# Patient Record
Sex: MALE | Race: WHITE | NOT HISPANIC OR LATINO | Employment: OTHER | URBAN - METROPOLITAN AREA
[De-identification: names, ages, dates, MRNs, and addresses within clinical notes are randomized per-mention and may not be internally consistent; named-entity substitution may affect disease eponyms.]

---

## 2021-08-14 ENCOUNTER — APPOINTMENT (EMERGENCY)
Dept: CT IMAGING | Facility: HOSPITAL | Age: 61
End: 2021-08-14
Payer: COMMERCIAL

## 2021-08-14 ENCOUNTER — HOSPITAL ENCOUNTER (EMERGENCY)
Facility: HOSPITAL | Age: 61
Discharge: HOME | End: 2021-08-15
Attending: EMERGENCY MEDICINE | Admitting: EMERGENCY MEDICINE
Payer: COMMERCIAL

## 2021-08-14 VITALS
SYSTOLIC BLOOD PRESSURE: 168 MMHG | HEART RATE: 98 BPM | TEMPERATURE: 97.6 F | RESPIRATION RATE: 18 BRPM | OXYGEN SATURATION: 98 % | WEIGHT: 165 LBS | DIASTOLIC BLOOD PRESSURE: 82 MMHG

## 2021-08-14 DIAGNOSIS — N20.0 KIDNEY STONE: Primary | ICD-10-CM

## 2021-08-14 LAB
ALBUMIN SERPL BCP-MCNC: 4.5 G/DL (ref 3.5–5)
ALP SERPL-CCNC: 79 U/L (ref 46–116)
ALT SERPL W P-5'-P-CCNC: 32 U/L (ref 12–78)
ANION GAP SERPL CALCULATED.3IONS-SCNC: 16 MMOL/L (ref 4–13)
AST SERPL W P-5'-P-CCNC: 29 U/L (ref 5–45)
BACTERIA UR QL AUTO: ABNORMAL /HPF
BASOPHILS # BLD AUTO: 0.03 THOUSANDS/ΜL (ref 0–0.1)
BASOPHILS NFR BLD AUTO: 0 % (ref 0–1)
BILIRUB SERPL-MCNC: 0.66 MG/DL (ref 0.2–1)
BILIRUB UR QL STRIP: NEGATIVE
BUN SERPL-MCNC: 20 MG/DL (ref 5–25)
CALCIUM SERPL-MCNC: 9.7 MG/DL (ref 8.3–10.1)
CHLORIDE SERPL-SCNC: 105 MMOL/L (ref 100–108)
CLARITY UR: CLEAR
CO2 SERPL-SCNC: 18 MMOL/L (ref 21–32)
COLOR UR: YELLOW
CREAT SERPL-MCNC: 1.5 MG/DL (ref 0.6–1.3)
EOSINOPHIL # BLD AUTO: 0.08 THOUSAND/ΜL (ref 0–0.61)
EOSINOPHIL NFR BLD AUTO: 1 % (ref 0–6)
ERYTHROCYTE [DISTWIDTH] IN BLOOD BY AUTOMATED COUNT: 11.9 % (ref 11.6–15.1)
GFR SERPL CREATININE-BSD FRML MDRD: 50 ML/MIN/1.73SQ M
GLUCOSE SERPL-MCNC: 128 MG/DL (ref 65–140)
GLUCOSE UR STRIP-MCNC: NEGATIVE MG/DL
HCT VFR BLD AUTO: 47.5 % (ref 36.5–49.3)
HGB BLD-MCNC: 15.6 G/DL (ref 12–17)
HGB UR QL STRIP.AUTO: ABNORMAL
IMM GRANULOCYTES # BLD AUTO: 0.07 THOUSAND/UL (ref 0–0.2)
IMM GRANULOCYTES NFR BLD AUTO: 1 % (ref 0–2)
KETONES UR STRIP-MCNC: NEGATIVE MG/DL
LEUKOCYTE ESTERASE UR QL STRIP: NEGATIVE
LIPASE SERPL-CCNC: 71 U/L (ref 73–393)
LYMPHOCYTES # BLD AUTO: 1.73 THOUSANDS/ΜL (ref 0.6–4.47)
LYMPHOCYTES NFR BLD AUTO: 13 % (ref 14–44)
MCH RBC QN AUTO: 31.3 PG (ref 26.8–34.3)
MCHC RBC AUTO-ENTMCNC: 32.8 G/DL (ref 31.4–37.4)
MCV RBC AUTO: 95 FL (ref 82–98)
MONOCYTES # BLD AUTO: 0.8 THOUSAND/ΜL (ref 0.17–1.22)
MONOCYTES NFR BLD AUTO: 6 % (ref 4–12)
NEUTROPHILS # BLD AUTO: 11.18 THOUSANDS/ΜL (ref 1.85–7.62)
NEUTS SEG NFR BLD AUTO: 79 % (ref 43–75)
NITRITE UR QL STRIP: NEGATIVE
NON-SQ EPI CELLS URNS QL MICRO: ABNORMAL /HPF
NRBC BLD AUTO-RTO: 0 /100 WBCS
PH UR STRIP.AUTO: 5.5 [PH]
PLATELET # BLD AUTO: 268 THOUSANDS/UL (ref 149–390)
PMV BLD AUTO: 10.5 FL (ref 8.9–12.7)
POTASSIUM SERPL-SCNC: 3.8 MMOL/L (ref 3.5–5.3)
PROT SERPL-MCNC: 7.8 G/DL (ref 6.4–8.2)
PROT UR STRIP-MCNC: NEGATIVE MG/DL
RBC # BLD AUTO: 4.98 MILLION/UL (ref 3.88–5.62)
RBC #/AREA URNS AUTO: ABNORMAL /HPF
SODIUM SERPL-SCNC: 139 MMOL/L (ref 136–145)
SP GR UR STRIP.AUTO: 1.02 (ref 1–1.03)
UROBILINOGEN UR QL STRIP.AUTO: 0.2 E.U./DL
WBC # BLD AUTO: 13.89 THOUSAND/UL (ref 4.31–10.16)
WBC #/AREA URNS AUTO: ABNORMAL /HPF

## 2021-08-14 PROCEDURE — G1004 CDSM NDSC: HCPCS

## 2021-08-14 PROCEDURE — 80053 COMPREHEN METABOLIC PANEL: CPT | Performed by: EMERGENCY MEDICINE

## 2021-08-14 PROCEDURE — 81001 URINALYSIS AUTO W/SCOPE: CPT | Performed by: EMERGENCY MEDICINE

## 2021-08-14 PROCEDURE — 96361 HYDRATE IV INFUSION ADD-ON: CPT

## 2021-08-14 PROCEDURE — 96374 THER/PROPH/DIAG INJ IV PUSH: CPT

## 2021-08-14 PROCEDURE — 74176 CT ABD & PELVIS W/O CONTRAST: CPT

## 2021-08-14 PROCEDURE — 36415 COLL VENOUS BLD VENIPUNCTURE: CPT

## 2021-08-14 PROCEDURE — 99284 EMERGENCY DEPT VISIT MOD MDM: CPT

## 2021-08-14 PROCEDURE — 99285 EMERGENCY DEPT VISIT HI MDM: CPT | Performed by: EMERGENCY MEDICINE

## 2021-08-14 PROCEDURE — 83690 ASSAY OF LIPASE: CPT | Performed by: EMERGENCY MEDICINE

## 2021-08-14 PROCEDURE — 85025 COMPLETE CBC W/AUTO DIFF WBC: CPT | Performed by: EMERGENCY MEDICINE

## 2021-08-14 RX ORDER — MORPHINE SULFATE 4 MG/ML
4 INJECTION, SOLUTION INTRAMUSCULAR; INTRAVENOUS ONCE
Status: COMPLETED | OUTPATIENT
Start: 2021-08-14 | End: 2021-08-14

## 2021-08-14 RX ADMIN — SODIUM CHLORIDE 1000 ML: 0.9 INJECTION, SOLUTION INTRAVENOUS at 22:54

## 2021-08-14 RX ADMIN — MORPHINE SULFATE 4 MG: 4 INJECTION INTRAVENOUS at 22:54

## 2021-08-15 PROCEDURE — 96361 HYDRATE IV INFUSION ADD-ON: CPT

## 2021-08-15 RX ORDER — SULFAMETHOXAZOLE AND TRIMETHOPRIM 800; 160 MG/1; MG/1
1 TABLET ORAL 2 TIMES DAILY
Qty: 10 TABLET | Refills: 0 | Status: SHIPPED | OUTPATIENT
Start: 2021-08-15 | End: 2021-08-20

## 2021-08-15 RX ORDER — OXYCODONE HYDROCHLORIDE 5 MG/1
5 TABLET ORAL ONCE
Status: COMPLETED | OUTPATIENT
Start: 2021-08-15 | End: 2021-08-15

## 2021-08-15 RX ORDER — ONDANSETRON 4 MG/1
4 TABLET, ORALLY DISINTEGRATING ORAL EVERY 6 HOURS PRN
Qty: 16 TABLET | Refills: 0 | Status: SHIPPED | OUTPATIENT
Start: 2021-08-15

## 2021-08-15 RX ORDER — SULFAMETHOXAZOLE AND TRIMETHOPRIM 800; 160 MG/1; MG/1
1 TABLET ORAL 2 TIMES DAILY
Qty: 10 TABLET | Refills: 0 | Status: SHIPPED | OUTPATIENT
Start: 2021-08-15 | End: 2021-08-15 | Stop reason: CLARIF

## 2021-08-15 RX ORDER — SULFAMETHOXAZOLE AND TRIMETHOPRIM 800; 160 MG/1; MG/1
1 TABLET ORAL ONCE
Status: COMPLETED | OUTPATIENT
Start: 2021-08-15 | End: 2021-08-15

## 2021-08-15 RX ORDER — ONDANSETRON 4 MG/1
4 TABLET, ORALLY DISINTEGRATING ORAL ONCE
Status: COMPLETED | OUTPATIENT
Start: 2021-08-15 | End: 2021-08-15

## 2021-08-15 RX ORDER — MORPHINE SULFATE 30 MG/1
15 TABLET ORAL EVERY 8 HOURS PRN
Qty: 12 TABLET | Refills: 0 | Status: SHIPPED | OUTPATIENT
Start: 2021-08-15

## 2021-08-15 RX ORDER — ACETAMINOPHEN 325 MG/1
650 TABLET ORAL EVERY 6 HOURS PRN
Qty: 30 TABLET | Refills: 0 | Status: SHIPPED | OUTPATIENT
Start: 2021-08-15 | End: 2021-08-20

## 2021-08-15 RX ORDER — ACETAMINOPHEN 325 MG/1
650 TABLET ORAL ONCE
Status: COMPLETED | OUTPATIENT
Start: 2021-08-15 | End: 2021-08-15

## 2021-08-15 RX ADMIN — SULFAMETHOXAZOLE AND TRIMETHOPRIM 1 TABLET: 800; 160 TABLET ORAL at 01:57

## 2021-08-15 RX ADMIN — ACETAMINOPHEN 650 MG: 325 TABLET, FILM COATED ORAL at 02:10

## 2021-08-15 RX ADMIN — ONDANSETRON 4 MG: 4 TABLET, ORALLY DISINTEGRATING ORAL at 02:10

## 2021-08-15 RX ADMIN — OXYCODONE HYDROCHLORIDE 5 MG: 5 TABLET ORAL at 02:10

## 2021-08-15 NOTE — DISCHARGE INSTRUCTIONS
Please go to [facility] for BMP lab  If creatinine result returns as greater than 1 5, please return to ED  Please take your Bactrim antibiotic as indicated for the next 4 days  Please follow up with PCP or return to ED for worsening symptoms such as new onset of spiking fevers, worsening abdominal pain, inability to urinate or any other worsening concerns

## 2021-08-15 NOTE — ED PROVIDER NOTES
History  Chief Complaint   Patient presents with    Flank Pain     Pt comes to ED c/o L flank pain since yesterday  +dysuria Hx of kidney stones     70-year-old male with past medical history of remote renal stones status post lithotripsy presents for evaluation of left-sided flank pain  Patient reports he is unsure when the flank pain began however noticed that it became noticeably worse yesterday afternoon  Patient characterizes flank pain as sharp and stabbing that wraps around to left upper quadrant abdomen and is intermittent in nature  He also reports dysuria, frequency and decreased urine stream, having denies associated symptoms of fever, chills, chest pain, any other abdominal pain, melena, N/V/D or any other symptoms  He denies taking any over-the-counter medication for pain before presenting to ED  No other concerns  None       Past Medical History:   Diagnosis Date    Renal disorder        History reviewed  No pertinent surgical history  History reviewed  No pertinent family history  I have reviewed and agree with the history as documented  E-Cigarette/Vaping    E-Cigarette Use Never User      E-Cigarette/Vaping Substances     Social History     Tobacco Use    Smoking status: Never Smoker    Smokeless tobacco: Never Used   Vaping Use    Vaping Use: Never used   Substance Use Topics    Alcohol use: Not Currently    Drug use: Never        Review of Systems   Constitutional: Negative  Negative for chills and fever  HENT: Negative  Eyes: Negative  Respiratory: Negative  Negative for cough and shortness of breath  Cardiovascular: Negative  Negative for chest pain  Gastrointestinal: Positive for abdominal pain  Negative for blood in stool, constipation, diarrhea, nausea and vomiting  Endocrine: Negative  Genitourinary: Positive for difficulty urinating, dysuria and urgency  Negative for frequency and hematuria  Musculoskeletal: Positive for back pain     Skin: Negative  Allergic/Immunologic: Negative  Neurological: Negative  Psychiatric/Behavioral: Negative  All other systems reviewed and are negative  Physical Exam  ED Triage Vitals [08/14/21 2042]   Temperature Pulse Respirations Blood Pressure SpO2   97 6 °F (36 4 °C) 98 18 168/82 98 %      Temp Source Heart Rate Source Patient Position - Orthostatic VS BP Location FiO2 (%)   Oral Monitor Sitting Left arm --      Pain Score       8             Orthostatic Vital Signs  Vitals:    08/14/21 2042   BP: 168/82   Pulse: 98   Patient Position - Orthostatic VS: Sitting       Physical Exam  Vitals and nursing note reviewed  Constitutional:       General: He is not in acute distress  Appearance: Normal appearance  He is not ill-appearing, toxic-appearing or diaphoretic  HENT:      Head: Normocephalic and atraumatic  Right Ear: External ear normal       Left Ear: External ear normal       Nose: Nose normal    Eyes:      Extraocular Movements: Extraocular movements intact  Cardiovascular:      Rate and Rhythm: Normal rate and regular rhythm  Pulses: Normal pulses  Heart sounds: Normal heart sounds  No murmur heard  Pulmonary:      Effort: Pulmonary effort is normal  No respiratory distress  Breath sounds: Normal breath sounds  No wheezing or rales  Abdominal:      General: Abdomen is flat  There is no distension  Palpations: Abdomen is soft  Tenderness: There is no abdominal tenderness  There is left CVA tenderness  There is no guarding or rebound  Comments: Mild left CVA tenderness   Musculoskeletal:         General: Normal range of motion  Cervical back: Normal range of motion  Skin:     General: Skin is warm and dry  Capillary Refill: Capillary refill takes less than 2 seconds  Neurological:      Mental Status: He is alert and oriented to person, place, and time     Psychiatric:         Mood and Affect: Mood normal          Behavior: Behavior normal          ED Medications  Medications   morphine (PF) 4 mg/mL injection 4 mg (4 mg Intravenous Given 8/14/21 2254)   sodium chloride 0 9 % bolus 1,000 mL (0 mL Intravenous Stopped 8/15/21 0136)   sulfamethoxazole-trimethoprim (BACTRIM DS) 800-160 mg per tablet 1 tablet (1 tablet Oral Given 8/15/21 0157)   acetaminophen (TYLENOL) tablet 650 mg (650 mg Oral Given 8/15/21 0210)   oxyCODONE (ROXICODONE) IR tablet 5 mg (5 mg Oral Given 8/15/21 0210)   ondansetron (ZOFRAN-ODT) dispersible tablet 4 mg (4 mg Oral Given 8/15/21 0210)       Diagnostic Studies  Results Reviewed     Procedure Component Value Units Date/Time    Urine Microscopic [[id number]]  (Abnormal) Collected: 08/14/21 2225    Lab Status: Final result Specimen: Urine, Clean Catch Updated: 08/14/21 2358     RBC, UA 20-30 /hpf      WBC, UA 2-4 /hpf      Epithelial Cells Occasional /hpf      Bacteria, UA Occasional /hpf     UA w Reflex to Microscopic w Reflex to Culture [[id number]]  (Abnormal) Collected: 08/14/21 2225    Lab Status: Final result Specimen: Urine, Clean Catch Updated: 08/14/21 2335     Color, UA Yellow     Clarity, UA Clear     Specific Gravity, UA 1 025     pH, UA 5 5     Leukocytes, UA Negative     Nitrite, UA Negative     Protein, UA Negative mg/dl      Glucose, UA Negative mg/dl      Ketones, UA Negative mg/dl      Urobilinogen, UA 0 2 E U /dl      Bilirubin, UA Negative     Blood, UA Trace-Intact    Comprehensive metabolic panel [[id number]]  (Abnormal) Collected: 08/14/21 2046    Lab Status: Final result Specimen: Blood from Arm, Right Updated: 08/14/21 2136     Sodium 139 mmol/L      Potassium 3 8 mmol/L      Chloride 105 mmol/L      CO2 18 mmol/L      ANION GAP 16 mmol/L      BUN 20 mg/dL      Creatinine 1 50 mg/dL      Glucose 128 mg/dL      Calcium 9 7 mg/dL      AST 29 U/L      ALT 32 U/L      Alkaline Phosphatase 79 U/L      Total Protein 7 8 g/dL      Albumin 4 5 g/dL      Total Bilirubin 0 66 mg/dL      eGFR 50 ml/min/1 73sq m Narrative:      National Kidney Disease Foundation guidelines for Chronic Kidney Disease (CKD):     Stage 1 with normal or high GFR (GFR > 90 mL/min/1 73 square meters)    Stage 2 Mild CKD (GFR = 60-89 mL/min/1 73 square meters)    Stage 3A Moderate CKD (GFR = 45-59 mL/min/1 73 square meters)    Stage 3B Moderate CKD (GFR = 30-44 mL/min/1 73 square meters)    Stage 4 Severe CKD (GFR = 15-29 mL/min/1 73 square meters)    Stage 5 End Stage CKD (GFR <15 mL/min/1 73 square meters)  Note: GFR calculation is accurate only with a steady state creatinine    Lipase [[id number]]  (Abnormal) Collected: 08/14/21 2046    Lab Status: Final result Specimen: Blood from Arm, Right Updated: 08/14/21 2136     Lipase 71 u/L     CBC and differential [[id number]]  (Abnormal) Collected: 08/14/21 2046    Lab Status: Final result Specimen: Blood from Arm, Right Updated: 08/14/21 2052     WBC 13 89 Thousand/uL      RBC 4 98 Million/uL      Hemoglobin 15 6 g/dL      Hematocrit 47 5 %      MCV 95 fL      MCH 31 3 pg      MCHC 32 8 g/dL      RDW 11 9 %      MPV 10 5 fL      Platelets 038 Thousands/uL      nRBC 0 /100 WBCs      Neutrophils Relative 79 %      Immat GRANS % 1 %      Lymphocytes Relative 13 %      Monocytes Relative 6 %      Eosinophils Relative 1 %      Basophils Relative 0 %      Neutrophils Absolute 11 18 Thousands/µL      Immature Grans Absolute 0 07 Thousand/uL      Lymphocytes Absolute 1 73 Thousands/µL      Monocytes Absolute 0 80 Thousand/µL      Eosinophils Absolute 0 08 Thousand/µL      Basophils Absolute 0 03 Thousands/µL                  CT renal stone study abdomen pelvis without contrast   Final Result by Carlos Arshad DO (08/15 0107)      Mild left-sided hydroureteronephrosis with a 3 mm obstructing stone in the distal left ureter/ UVJ  Moderate left-sided perinephric stranding      The study was marked in EPIC for immediate notification              Workstation performed: [id number] Procedures  Procedures      ED Course                                       MDM  Number of Diagnoses or Management Options  Kidney stone  Diagnosis management comments: 76-year-old male with past medical history of remote renal stones status post lithotripsy presents for evaluation of left-sided flank pain  Patient reports he is unsure when the flank pain began however noticed that it became noticeably worse yesterday afternoon  Patient remained fairly stable throughout ED course  Pain was well managed with 4 mg of morphine IV, 4 mg Zofran, Tylenol and oxycodone  CBC remarkable for elevated white blood cell count, CMP remarkable for creatinine 1 50 with no previous available for comparison  Urinalysis showed intact red blood cells with no evidence infection  CT abdomen pelvis renal protocol showed 3 mm renal stone in the UPJ with mild L hydronephrosis and mild perinephric stranding  Patient agreed to plan for d/c with follow-up BMP at outside facility to monitor trend in creatinine  Instructed patient to return to ER if creatinine became elevated from today's 1 50 as well as for worsening symptoms such as spiking fevers, difficulty urinating, worsening abdominal pain  D/c to home with Rx pain medications and a 5 day course of Bactrim with 1st dose started here today  Patient agrees to plan  All questions answered  No other concerns         Amount and/or Complexity of Data Reviewed  Clinical lab tests: reviewed and ordered  Tests in the radiology section of CPT®: ordered and reviewed  Tests in the medicine section of CPT®: ordered and reviewed    Risk of Complications, Morbidity, and/or Mortality  Presenting problems: moderate  Diagnostic procedures: moderate  Management options: moderate    Patient Progress  Patient progress: stable      Disposition  Final diagnoses:   Kidney stone     Time reflects when diagnosis was documented in both MDM as applicable and the Disposition within this note     Time User Action Codes Description Comment    8/15/2021  1:36 AM Rea Mckenzie Add [N20 0] Kidney stone       ED Disposition     ED Disposition Condition Date/Time Comment    Discharge Stable Sun Aug 15, 2021  1:36 AM Rosie Chairez discharge to home/self care  Follow-up Information     Follow up With Specialties Details Why [address] [place] [address] Emergency Department Emergency Medicine Go to  If symptoms worsen, if pain worsens, if you develop a fever, if creatining is worse than today: 1 5 2220 [address] Emergency Department, [address], [place], [place], [address]          Discharge Medication List as of 8/15/2021  1:49 AM      START taking these medications    Details   acetaminophen (TYLENOL) 325 mg tablet Take 2 tablets (650 mg total) by mouth every 6 (six) hours as needed for mild pain for up to 5 days, Starting Sun 8/15/2021, Until Fri 8/20/2021 at 2359, Normal      morphine (MSIR) 30 MG tablet Take 0 5 tablets (15 mg total) by mouth every 8 (eight) hours as needed for severe pain for up to 10 dosesMax Daily Amount: 45 mg, Starting Sun 8/15/2021, Normal      ondansetron (ZOFRAN-ODT) 4 mg disintegrating tablet Take 1 tablet (4 mg total) by mouth every 6 (six) hours as needed for nausea or vomiting, Starting Sun 8/15/2021, Normal      sulfamethoxazole-trimethoprim (BACTRIM DS) 800-160 mg per tablet Take 1 tablet by mouth 2 (two) times a day for 5 days smx-tmp DS (BACTRIM) 800-160 mg tabs (1tab q12 D10), Starting Sun 8/15/2021, Until Fri 8/20/2021, Normal           Outpatient Discharge Orders   Basic metabolic panel   Standing Status: Future Standing Exp  Date: 08/15/22       PDMP Review     None           ED Provider  Attending physically available and evaluated Rosie Chairez  I managed the patient along with the ED Attending      Electronically Signed by Eusebia Giron MD  08/15/21 9604

## 2021-08-15 NOTE — ED ATTENDING ATTESTATION
8/14/2021  I, Joselin Olivas MD, saw and evaluated the patient  I have discussed the patient with the resident/non-physician practitioner and agree with the resident's/non-physician practitioner's findings, Plan of Care, and MDM as documented in the resident's/non-physician practitioner's note, except where noted  All available labs and Radiology studies were reviewed  I was present for key portions of any procedure(s) performed by the resident/non-physician practitioner and I was immediately available to provide assistance  At this point I agree with the current assessment done in the Emergency Department  I have conducted an independent evaluation of this patient a history and physical is as follows:    ED Course       Critical Care Time  Procedures      Patient is a pleasant 63 yom with left flank pain, intermittent  + urgency  + decrease stream      No hematuria  No f/c/s to suggest systemic infection  No vomiting or diarrhea  MDM - 63 yom likely stone, will treat pain, will ct to rule out acute intraabdominal process  Of note, patient feeling much better at time of discharge  Discussed the CT scan findings with her, given small size of the stone, distal stone, will discharge home, follow-up, strict return precautions

## 2022-01-07 ENCOUNTER — TELEPHONE (OUTPATIENT)
Dept: UROLOGY | Facility: MEDICAL CENTER | Age: 62
End: 2022-01-07

## 2022-01-07 NOTE — TELEPHONE ENCOUNTER
Calling about taken from voice mail in reference to patient medical record     Returned call and left message with Susan Huntley to return call to our office with more information with request

## 2022-01-10 NOTE — TELEPHONE ENCOUNTER
Called Awa back and explained that we never saw patient in office   She was looking for imaging and had talked to someone

## 2023-12-02 ENCOUNTER — HOSPITAL ENCOUNTER (EMERGENCY)
Facility: HOSPITAL | Age: 63
Discharge: HOME | End: 2023-12-02
Attending: EMERGENCY MEDICINE | Admitting: EMERGENCY MEDICINE
Payer: COMMERCIAL

## 2023-12-02 VITALS
OXYGEN SATURATION: 95 % | TEMPERATURE: 98.4 F | SYSTOLIC BLOOD PRESSURE: 181 MMHG | DIASTOLIC BLOOD PRESSURE: 91 MMHG | WEIGHT: 176.81 LBS | HEART RATE: 95 BPM | RESPIRATION RATE: 17 BRPM

## 2023-12-02 DIAGNOSIS — T18.128A FOOD IMPACTION OF ESOPHAGUS, INITIAL ENCOUNTER: Primary | ICD-10-CM
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
## 2023-12-02 PROCEDURE — 99283 EMERGENCY DEPT VISIT LOW MDM: CPT

## 2023-12-02 PROCEDURE — 99283 EMERGENCY DEPT VISIT LOW MDM: CPT | Performed by: EMERGENCY MEDICINE

## 2023-12-03 NOTE — ED PROVIDER NOTES
History  Chief Complaint   Patient presents with    Difficulty Swallowing     Has a difficult time swallowing solids, occasionally painful to force down with fluids. Feels dizzy sometimes with this. Has been ongoing for the last year. Had a barium study previously. Was eating steak and mashed potatoes for dinner and had a difficult time with that. Vomited three times tonight, only liquid came out. 70-year-old male, presents with foreign body sensation in his esophagus. ,  States he was eating steak, felt to get caught, tried to drink water to get it go down, felt the sensation of discomfort in his chest get worse and increasing pressure went to the bathroom and spit up the water he just drank but no steak and no stomach contents, left the restaurant he was at and came here, and route he felt great improvement of the discomfort but still feels some discomfort. Now is able to drink soda without any difficulty and feels a passing into his stomach states he was not able to do that prior. States he has had a history of difficulty swallowing for over a year, had a barium swallow but never an endoscopy barium swallow was normal as per patient. Currently asymptomatic          Prior to Admission Medications   Prescriptions Last Dose Informant Patient Reported? Taking? morphine (MSIR) 30 MG tablet Not Taking  No No   Sig: Take 0.5 tablets (15 mg total) by mouth every 8 (eight) hours as needed for severe pain for up to 10 dosesMax Daily Amount: 45 mg   Patient not taking: Reported on 12/2/2023   ondansetron (ZOFRAN-ODT) 4 mg disintegrating tablet Not Taking  No No   Sig: Take 1 tablet (4 mg total) by mouth every 6 (six) hours as needed for nausea or vomiting   Patient not taking: Reported on 12/2/2023      Facility-Administered Medications: None       Past Medical History:   Diagnosis Date    Renal disorder        History reviewed. No pertinent surgical history. History reviewed. No pertinent family history.   I have reviewed and agree with the history as documented. E-Cigarette/Vaping    E-Cigarette Use Never User      E-Cigarette/Vaping Substances     Social History     Tobacco Use    Smoking status: Never    Smokeless tobacco: Never   Vaping Use    Vaping Use: Never used   Substance Use Topics    Alcohol use: Not Currently    Drug use: Never       Review of Systems   Constitutional:  Negative for fever. Respiratory:  Negative for chest tightness and shortness of breath. Cardiovascular:  Negative for chest pain. Skin:  Negative for rash. Neurological:  Negative for dizziness, light-headedness and headaches. Physical Exam  Physical Exam  Vitals reviewed. Constitutional:       General: He is not in acute distress. Appearance: He is well-developed. HENT:      Head: Atraumatic. Nose: Nose normal.   Eyes:      General: No scleral icterus. Right eye: No discharge. Left eye: No discharge. Conjunctiva/sclera: Conjunctivae normal.   Neck:      Trachea: No tracheal deviation. Pulmonary:      Effort: Pulmonary effort is normal. No respiratory distress. Breath sounds: No stridor. Abdominal:      General: Abdomen is flat. There is no distension. Palpations: Abdomen is soft. Tenderness: There is no abdominal tenderness. Musculoskeletal:         General: No deformity. Cervical back: Normal range of motion. Skin:     General: Skin is warm and dry. Coloration: Skin is not pale. Findings: No erythema or rash. Neurological:      Mental Status: He is alert. Motor: No abnormal muscle tone.       Coordination: Coordination normal.         Vital Signs  ED Triage Vitals [12/02/23 1845]   Temperature Pulse Respirations Blood Pressure SpO2   98.4 °F (36.9 °C) 95 17 (!) 181/91 95 %      Temp Source Heart Rate Source Patient Position - Orthostatic VS BP Location FiO2 (%)   Oral Monitor Sitting Right arm --      Pain Score       --           Vitals: 12/02/23 1845   BP: (!) 181/91   Pulse: 95   Patient Position - Orthostatic VS: Sitting         Visual Acuity      ED Medications  Medications - No data to display    Diagnostic Studies  Results Reviewed       None                   No orders to display              Procedures  Procedures         ED Course                                             Medical Decision Making        48-year-old male who had a foreign body sensation in his esophagus after eating steak, has had difficulty swallowing and similar sensations over the past year. Tonight likely had a esophageal food bolus/impaction, resolved spontaneously prior to ED arrival now able to eat crackers and drink soda without difficulty. Advised patient this will likely happen again, to eat soft/puréed foods and liquid foods until he can follow with GI I do feel he would benefit from an endoscopy patient and wife understand and agree. Disposition  Final diagnoses:   Food impaction of esophagus, initial encounter     Time reflects when diagnosis was documented in both MDM as applicable and the Disposition within this note       Time User Action Codes Description Comment    12/2/2023  7:14 PM Luis Barker Add [Q05.096V,  W44.F3XA] Food impaction of esophagus, initial encounter           ED Disposition       ED Disposition   Discharge    Condition   Stable    Date/Time   Sat Dec 2, 2023  7:14 PM    Comment   Rock Greene discharge to home/self care.                    Follow-up Information       Follow up With Specialties Details Why Contact Info Additional [address] [facility]) Gastroenterology Call in 1 day To arrange for the next available appointment [address] [facility]  [address] [facility] [address] [place] [address] [address]), [facility], [address] [place] [address], [id number] [phone]            Discharge Medication List as of 12/2/2023 7:15 PM        CONTINUE these medications which have NOT CHANGED    Details   morphine (MSIR) 30 MG tablet Take 0.5 tablets (15 mg total) by mouth every 8 (eight) hours as needed for severe pain for up to 10 dosesMax Daily Amount: 45 mg, Starting Sun 8/15/2021, Normal      ondansetron (ZOFRAN-ODT) 4 mg disintegrating tablet Take 1 tablet (4 mg total) by mouth every 6 (six) hours as needed for nausea or vomiting, Starting Sun 8/15/2021, Normal             No discharge procedures on file.     PDMP Review       None            ED Provider  Electronically Signed by             Adelaide Mohan DO  12/02/23 2012